# Patient Record
(demographics unavailable — no encounter records)

---

## 2025-04-03 NOTE — REASON FOR VISIT
[Behavioral Health Urgent Care Assessment] : a behavioral health urgent care assessment [School] : school [Patient] : patient [Mother] : mother [Self] : alone [TextBox_17] : connection to care
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
## 2025-04-03 NOTE — PLAN
[Contact was Attempted] : contact was attempted [TextBox_9] : linkage to individual therapy [TextBox_11] : not indicated [TextBox_13] : Parent denies patient has ever expressed SHIIP, denies knowledge or evidence of SIB, no acute safety concerns. Family aware to visit ED or call 911 if symptoms worsen or if safety concerns arise. [TextBox_26] : VM left for school SW

## 2025-04-03 NOTE — RISK ASSESSMENT
[Clinical Interview] : Clinical Interview [Collateral Sources] : Collateral Sources [Mood disorder] : mood disorder [Depressed mood/Anhedonia] : depressed mood/anhedonia [History of Impulsivity] : history of impulsivity [Non-compliant or not receiving treatment] : non-compliant or not receiving treatment [Triggering events leading to humiliation, shame, and/or despair] : triggering events leading to humiliation, shame, and/or despair (e.g. loss of relationship, financial or health status) (real or anticipated) [Identifies reasons for living] : identifies reasons for living [Supportive social network of family or friends] : supportive social network of family or friends [Positive therapeutic relationships] : positive therapeutic relationships [Responsibility to children, family, or others] : responsibility to children, family, or others [Engaged in work or school] : engaged in work or school [Yes (details below)] : yes [Yes, more than 3 months ago] : yes, more than 3 months ago [None Known] : none known [No] : no [Irritability] : irritability [Residential stability] : residential stability [Relationship stability] : relationship stability [Sobriety] : sobriety [Yes] : yes

## 2025-04-03 NOTE — DISCUSSION/SUMMARY
[Low acute suicide risk] : Low acute suicide risk [No] : No [Not clinically indicated] : Safety Plan completed/updated (for individuals at risk): Not clinically indicated [FreeTextEntry1] : Patient presents as low acute risk, with risk factors including sxs of depression & anxiety, including irritability, anhedonia, poor self esteem and low energy. Patient has significant protective factors including strong family/social support, lack of prior self-harm, no suicide attempts, no substance use, current willingness to engage in treatment, participation in safety planning, future orientation with long & short term goals for the future, hopeful, help-seeking, engaged in school & activities, current denial of any SI, plan or intent or urges to self-harm, no reported hx of abuse/trauma, no aggression/violence, no access to guns/family is able to means restrict, no legal history.

## 2025-04-03 NOTE — SOCIAL HISTORY
[Yes] : yes [No Known Use] : no known use [FreeTextEntry1] : Pt is a 15 y/o female in 10th grade at [employer], domiciled with mother, stepfather, 2 half brothers (8yo & 3yo), and uncle, w/ no PPH, no past inpt admissions, no past suicide attempts or SIB, hx of marijuana usage, and no hx of abuse, BIB mother, referred by school SW due to concerns for changes in mood/personality, and help connecting to care. [TextBox_7] : pt endorses intermittent marijuana usage last year to cope with breakup; decided to stop on her own, denies any current or recent usage